# Patient Record
Sex: FEMALE | Race: WHITE | Employment: OTHER | URBAN - METROPOLITAN AREA
[De-identification: names, ages, dates, MRNs, and addresses within clinical notes are randomized per-mention and may not be internally consistent; named-entity substitution may affect disease eponyms.]

---

## 2021-03-31 ENCOUNTER — HOSPITAL ENCOUNTER (OUTPATIENT)
Dept: SPEECH THERAPY | Facility: CLINIC | Age: 51
Setting detail: THERAPIES SERIES
End: 2021-03-31
Attending: FAMILY MEDICINE
Payer: COMMERCIAL

## 2021-03-31 ENCOUNTER — HOSPITAL ENCOUNTER (OUTPATIENT)
Dept: GENERAL RADIOLOGY | Facility: CLINIC | Age: 51
Discharge: HOME | End: 2021-03-31
Attending: NURSE PRACTITIONER | Admitting: NURSE PRACTITIONER
Payer: COMMERCIAL

## 2021-03-31 DIAGNOSIS — K21.9 GASTROESOPHAGEAL REFLUX DISEASE, UNSPECIFIED WHETHER ESOPHAGITIS PRESENT: ICD-10-CM

## 2021-03-31 DIAGNOSIS — Z71.3 DIETARY COUNSELING AND SURVEILLANCE: ICD-10-CM

## 2021-03-31 DIAGNOSIS — R03.0 ELEVATED BLOOD PRESSURE READING WITHOUT DIAGNOSIS OF HYPERTENSION: ICD-10-CM

## 2021-03-31 DIAGNOSIS — R13.13 PHARYNGEAL DYSPHAGIA: ICD-10-CM

## 2021-03-31 PROCEDURE — 92611 MOTION FLUOROSCOPY/SWALLOW: CPT | Mod: GN | Performed by: SPEECH-LANGUAGE PATHOLOGIST

## 2021-03-31 PROCEDURE — 74230 X-RAY XM SWLNG FUNCJ C+: CPT

## 2021-03-31 PROCEDURE — 92610 EVALUATE SWALLOWING FUNCTION: CPT | Mod: GN | Performed by: SPEECH-LANGUAGE PATHOLOGIST

## 2021-03-31 NOTE — PROGRESS NOTES
"   03/31/21 1300   General Information   Type Of Visit Initial   Start Of Care Date 03/31/21   Orders Evaluate And Treat   Orders Comment Video swallow study   Medical Diagnosis Pharyngeal dysphagia, GERD   Onset Of Illness/injury Or Date Of Surgery 03/08/21   Pertinent History of Current Problem/OT: Additional Occupational Profile Info Patient arrives today reporting pharyngeal dysphagia sx: feeling of \"choking\" when drinking while turning her head, pills feeling stuck in her throat at times. Patient's PMH is significant for fibromyalgia, migraines, hx of C5-C7 ACDF in July of 2017.  She feels there is excessive anterior neck tension, like her throat is being squeezed.    Respiratory Status Room air   Prior Level Of Function Swallowing   Prior Level Of Function Comment Regular diet with thin liquids.    Patient Role/employment History Disabled   Living Environment House/[facility]   Home/community Accessibility Comments (flowsheet Row) Independent   General Observations Patient is cooperative, reports having experienced panic attack on the way into appt today.    Patient/family Goals To avoid choking, improve throat sensation.    Fall Risk Screen   Have you fallen 2 or more times in the past year? No   Have you fallen and had an injury in the past year? No   Is patient a fall risk? No   Abuse Screen (yes response referral indicated)   Feels Unsafe at Home or Work/School no   Feels Threatened by Someone no   Does Anyone Try to Keep You From Having Contact with Others or Doing Things Outside Your Home? no   Physical Signs of Abuse Present no   Clinical Swallow Evaluation   Oral Musculature generally intact   Structural Abnormalities none present   Dentition present and adequate   Mucosal Quality good   Mandibular Strength and Mobility intact   Oral Labial Strength and Mobility WFL   Lingual Strength and Mobility WFL   Velar Elevation intact   Buccal Strength and Mobility intact   Laryngeal Function Cough;Throat " clear;Swallow;Voicing initiated;Dry swallow palpated   Additional Documentation Yes   Additional evaluation(s) completed today Yes;Recommended   Rationale for completing additional evaluation Video swallow study indicated to assess aspiration risk, oropharyngeal and cervical esophageal dysphagia.    Clinical Swallow Eval: Thin Liquid Texture Trial   Mode of Presentation, Thin Liquids cup;self-fed   Volume of Liquid or Food Presented 6 oz   Oral Phase of Swallow WFL   Pharyngeal Phase of Swallow intact   Diagnostic Statement No overt Sx of aspiration   VFSS Evaluation   VFSS Additional Documentation Yes   VFSS Eval: Thin Liquid Texture Trial   Mode of Presentation, Thin Liquid cup;self-fed   Preparatory Phase WFL   Oral Phase, Thin Liquid WFL   Pharyngeal Phase, Thin Liquid WFL   Rosenbek's Penetration Aspiration Scale: Thin Liquid Trial Results 1 - no aspiration, contrast does not enter airway   Diagnostic Statement WFL   VFSS Eval: Puree Solid Texture Trial   Mode of Presentation, Puree spoon;self-fed   Preparatory Phase WFL   Oral Phase, Puree WFL   Pharyngeal Phase, Puree WFL   Rosenbek's Penetration Aspiration Scale: Puree Food Trial Results 1 - no aspiration, contrast does not enter airway   Diagnostic Statement WFL   VFSS Eval: Solid Food Texture Trial   Mode of Presentation, Solid spoon;self-fed   Preparatory Phase WFL   Oral Phase, Solid WFL   Pharyngeal Phase, Solid Residue in valleculae   Rosenbek's Penetration Aspiration Scale: Solid Food Trial Results 1 - no aspiration, contrast does not enter airway   Diagnostic Statement Pill retained in valleculae until liquid wash   Esophageal Phase of Swallow   Patient reports or presents with symptoms of esophageal dysphagia Yes   Esophageal sweep performed during today s vidofluoroscopic exam  No   Esophageal comments Cervical esophagus allows pill to pass   Swallow Eval: Clinical Impressions   Skilled Criteria for Therapy Intervention No problems identified which  require skilled intervention   Functional Assessment Scale (FAS) 5   Dysphagia Outcome Severity Scale (GÓMEZ) Level 5 - GÓMEZ   Treatment Diagnosis Mild oropharyngeal dysphagia   Diet texture recommendations Regular diet;Thin liquids   Recommended Feeding/Eating Techniques maintain upright posture during/after eating for 30 mins;alternate between small bites and sips of food/liquid   Rehab Potential good, to achieve stated therapy goals   Predicted Duration of Therapy Intervention (days/wks) n/a   Anticipated Discharge Disposition home   Risks and Benefits of Treatment have been explained. Yes   Patient, family and/or staff in agreement with Plan of Care Yes   Clinical Impression Comments Patient presents with mild pharyngeal dysphagia characterized by pill retention in vallculae, mild pyriform sinus residue with liquid backflow through UES.  Patient's cervical spine hardware does not appear obstructive to food and liquid flow, however, suspect there could be component of sensory input at C5 screw area.   There was no aspiration observed throughout the course of the study.    Total Session Time   SLP Eval: oral/pharyngeal swallow function, clinical minutes (61593) 25   SLP Eval: VideoFluoroscopic Swallow function Minutes (85517) 15   Total Evaluation Time 40

## 2022-03-15 ENCOUNTER — MEDICAL CORRESPONDENCE (OUTPATIENT)
Dept: HEALTH INFORMATION MANAGEMENT | Facility: CLINIC | Age: 52
End: 2022-03-15
Payer: COMMERCIAL

## 2022-03-17 ENCOUNTER — TRANSCRIBE ORDERS (OUTPATIENT)
Dept: OTHER | Age: 52
End: 2022-03-17

## 2022-03-17 DIAGNOSIS — R13.10 DYSPHAGIA, UNSPECIFIED TYPE: Primary | ICD-10-CM
